# Patient Record
Sex: MALE | Race: BLACK OR AFRICAN AMERICAN | NOT HISPANIC OR LATINO | URBAN - METROPOLITAN AREA
[De-identification: names, ages, dates, MRNs, and addresses within clinical notes are randomized per-mention and may not be internally consistent; named-entity substitution may affect disease eponyms.]

---

## 2020-02-17 NOTE — PATIENT DISCUSSION
"""Continue Artificial tears (thera tears) both eyes twice a day . Start Warm compresses both eyes ""

## 2020-07-08 ENCOUNTER — IMPORTED ENCOUNTER (OUTPATIENT)
Dept: URBAN - METROPOLITAN AREA CLINIC 9 | Facility: CLINIC | Age: 80
End: 2020-07-08

## 2021-07-29 ENCOUNTER — IMPORTED ENCOUNTER (OUTPATIENT)
Dept: URBAN - METROPOLITAN AREA CLINIC 9 | Facility: CLINIC | Age: 81
End: 2021-07-29

## 2021-07-29 PROBLEM — H43.813: Noted: 2021-07-29

## 2021-07-29 PROBLEM — H11.153: Noted: 2021-07-29

## 2021-07-29 PROBLEM — H26.492: Noted: 2021-07-29

## 2021-09-09 NOTE — PATIENT DISCUSSION
Start Tobradex OS Qd. BCL applied to day. (Acuvue Oasys +0.00 BC 8.8 MARIA DEL CARMEN 14.0). Follow up in 1 week.

## 2021-09-10 NOTE — PATIENT DISCUSSION
BCL removed in office by technician. Patient declined BCL. Abrasion noted at @4:30-6:30. Continue Tobradex OS QID. Advised patient to start PF tears as needed. Start Gel drop QHS.

## 2021-09-16 NOTE — PATIENT DISCUSSION
healed,  discussed recurrent erosion syndrome with patient,  recommend patient use artificial tear ointment at bedtime every single night.

## 2021-10-17 ASSESSMENT — KERATOMETRY
OS_K2POWER_DIOPTERS: 41.25
OD_AXISANGLE_DEGREES: 44
OD_K1POWER_DIOPTERS: 40
OD_AXISANGLE_DEGREES: 81
OS_AXISANGLE_DEGREES: 70
OS_AXISANGLE2_DEGREES: 162
OS_AXISANGLE2_DEGREES: 160
OS_AXISANGLE_DEGREES: 72
OD_AXISANGLE2_DEGREES: 171
OD_K2POWER_DIOPTERS: 41
OD_K1POWER_DIOPTERS: 40.5
OS_K2POWER_DIOPTERS: 41.25
OD_K2POWER_DIOPTERS: 41
OD_AXISANGLE2_DEGREES: 134
OS_K1POWER_DIOPTERS: 39.75
OS_K1POWER_DIOPTERS: 39.75

## 2021-10-17 ASSESSMENT — VISUAL ACUITY
OD_CC: 20/20 SN
OS_CC: 20/20 - SN
OD_CC: 20/20 - SN
OS_SC: 20/30 - SN
OS_CC: 20/20 - SN
OD_SC: 20/50 SN
OS_CC: 20/20 SN
OS_SC: 20/50 SN
OD_SC: 20/30 - SN
OD_CC: 20/25 + SN
OS_CC: 20/25 SN

## 2021-10-17 ASSESSMENT — TONOMETRY
OS_IOP_MMHG: 13
OD_IOP_MMHG: 12
OD_IOP_MMHG: 12
OS_IOP_MMHG: 12

## 2021-10-22 NOTE — PATIENT DISCUSSION
Advised patient to RTC sooner if increased pain, redness, or visual changes. Patient will be out of town for 1 week. Will monitor in 1 week.

## 2021-10-22 NOTE — PATIENT DISCUSSION
Discussed recurrent erosion syndrome with patient,  recommend patient continue use of artificial tear ointment at bedtime every single night.

## 2021-10-22 NOTE — PATIENT DISCUSSION
Recommend BCL but patient declines.  Patient states BCL previously caused irritation and increased discomfort in the past.

## 2021-10-22 NOTE — PATIENT DISCUSSION
Discussed with patient about trying a Prokera Ring to help limit the recurring erosions if this problem persists.

## 2021-11-12 NOTE — PATIENT DISCUSSION
Discussed with patient the option for punctal plugs. Explained to patient the purpose for punctal plugs.

## 2021-11-12 NOTE — PATIENT DISCUSSION
Discussed the option and side affects of Xiidra and Restasis. Also informed the patient that they do take longer to show signs of relief. Xiidra- 6-8 weeks; Restasis- 2-3 months. Patient wants to continue current regiment. Sample of Mario Patterson given for patient to trial OU BID.

## 2022-01-14 NOTE — PATIENT DISCUSSION
Discussed with patient the option for punctal plugs. Explained to patient the purpose for punctal plugs. spontaneous

## 2022-01-14 NOTE — PATIENT DISCUSSION
Discussed the option and side affects of Xiidra and Restasis. Also informed the patient that they do take longer to show signs of relief. Xiidra- 6-8 weeks; Restasis- 2-3 months. Patient wants to continue current regiment. Sample of Yanni Solo given for patient to trial OU BID.

## 2022-01-14 NOTE — PATIENT DISCUSSION
Will start patient on Prolensa 1gtt QD, OU. Explained to patient that this is essentially Advil for the eye. Discontinue baby shampoo, and use Ocu Soft lid Scrubs. Provided patient sample.

## 2022-01-18 NOTE — PATIENT DISCUSSION
Patient did not start prolensa secondary to allergic to sulfa and her pharmacist told her not to start.

## 2022-01-18 NOTE — PATIENT DISCUSSION
Instructed patient if this does not help she is to call the office for a follow up.  Recommend patient keep annual exam that is scheduled.

## 2022-01-18 NOTE — PATIENT DISCUSSION
Recommend patient start tobradex 4 times a day in both eyes for 1 week then stop,  start pataday green label and use it once a day in both eyes.

## 2022-02-23 NOTE — PATIENT DISCUSSION
Discussed the option and side affects of Xiidra and Restasis. Also informed the patient that they do take longer to show signs of relief. Xiidra- 6-8 weeks; Restasis- 2-3 months. Patient wants to continue current regiment. Sample of Rayne Carmen given for patient to trial OU BID.

## 2022-02-23 NOTE — PATIENT DISCUSSION
Recommend patient start Flarex OU TID until sample is gone. Once Flarex is gone patient is to restart the Pataday OU (everyday no matter what). Patient instructed do not rub eyes and to use a cool compress if itching continues. Will check symptoms in 2 weeks. If patient has CARMEN at that appointment we will do PP at that time.

## 2022-02-23 NOTE — PATIENT DISCUSSION
Recommend patient start OTC allergy oral medication. Also recommend patient see allergist to see what she is actually allergic too.

## 2022-03-09 NOTE — PATIENT DISCUSSION
Discussed the option and side affects of Xiidra and Restasis. Also informed the patient that they do take longer to show signs of relief. Xiidra- 6-8 weeks; Restasis- 2-3 months. Patient wants to continue current regiment. Sample of Steven David given for patient to trial OU BID.

## 2022-03-09 NOTE — PATIENT DISCUSSION
Patient has noticed improvement. Will have her continue Zaditor. Continue artificial tears 4 times a day. Name band;

## 2022-04-06 ENCOUNTER — ESTABLISHED PATIENT (OUTPATIENT)
Dept: URBAN - METROPOLITAN AREA CLINIC 4 | Facility: CLINIC | Age: 82
End: 2022-04-06

## 2022-04-06 DIAGNOSIS — H11.153: ICD-10-CM

## 2022-04-06 PROCEDURE — 92015 DETERMINE REFRACTIVE STATE: CPT

## 2022-04-06 PROCEDURE — 92014 COMPRE OPH EXAM EST PT 1/>: CPT

## 2022-04-06 ASSESSMENT — KERATOMETRY
OS_AXISANGLE_DEGREES: 072
OD_AXISANGLE2_DEGREES: 171
OS_K1POWER_DIOPTERS: 39.50
OD_K1POWER_DIOPTERS: 40.75
OD_K1POWER_DIOPTERS: 40
OS_AXISANGLE2_DEGREES: 160
OD_AXISANGLE2_DEGREES: 174
OD_AXISANGLE_DEGREES: 81
OS_K1POWER_DIOPTERS: 39.75
OD_K2POWER_DIOPTERS: 41
OD_K2POWER_DIOPTERS: 41.00
OS_AXISANGLE_DEGREES: 70
OS_AXISANGLE2_DEGREES: 162
OS_K2POWER_DIOPTERS: 41.25
OD_AXISANGLE_DEGREES: 084

## 2022-04-06 ASSESSMENT — VISUAL ACUITY
OD_GLARE: 20/50
OS_SC: 20/30-2
OD_SC: 20/30-2
OS_GLARE: 20/40
OS_CC: 20/25-1
OD_CC: 20/25-1
OU_CC: 20/25
OU_SC: 20/30-1

## 2022-04-06 ASSESSMENT — TONOMETRY
OD_IOP_MMHG: 12
OS_IOP_MMHG: 13

## 2022-05-16 NOTE — PATIENT DISCUSSION
Patient has noticed improvement. Will have her continue Zaditor. Continue artificial tears 4 times a day.

## 2022-09-19 NOTE — PATIENT DISCUSSION
OD>OS. Recommended keeping eyes moist. Gave patient a sample of Systane PF and Refresh PF, recommended patient to just continue trying drops that work best for you. Advised patient to use PF every hour, if not PF only use QID. Patient made aware that after using drops her vision will be blurry for a few minutes after instilling.

## 2023-04-06 ENCOUNTER — ESTABLISHED PATIENT (OUTPATIENT)
Dept: URBAN - METROPOLITAN AREA CLINIC 4 | Facility: CLINIC | Age: 83
End: 2023-04-06

## 2023-04-06 DIAGNOSIS — H11.153: ICD-10-CM

## 2023-04-06 DIAGNOSIS — H26.492: ICD-10-CM

## 2023-04-06 DIAGNOSIS — H43.813: ICD-10-CM

## 2023-04-06 PROCEDURE — 92014 COMPRE OPH EXAM EST PT 1/>: CPT

## 2023-04-06 PROCEDURE — 92015 DETERMINE REFRACTIVE STATE: CPT

## 2023-04-06 ASSESSMENT — KERATOMETRY
OS_K1POWER_DIOPTERS: 39.75
OS_K2POWER_DIOPTERS: 41.50
OD_K1POWER_DIOPTERS: 40.00
OD_AXISANGLE_DEGREES: 70
OD_AXISANGLE2_DEGREES: 160
OS_AXISANGLE_DEGREES: 66
OD_K2POWER_DIOPTERS: 41.25
OS_AXISANGLE2_DEGREES: 156

## 2023-04-06 ASSESSMENT — VISUAL ACUITY
OD_GLARE: 20/50
OS_GLARE: 20/70
OU_SC: 20/40
OD_SC: 20/40
OS_SC: 20/40-2

## 2023-04-06 ASSESSMENT — TONOMETRY
OS_IOP_MMHG: 12
OD_IOP_MMHG: 12

## 2024-04-08 ENCOUNTER — ESTABLISHED PATIENT (OUTPATIENT)
Facility: LOCATION | Age: 84
End: 2024-04-08

## 2024-04-08 DIAGNOSIS — H26.492: ICD-10-CM

## 2024-04-08 DIAGNOSIS — H11.153: ICD-10-CM

## 2024-04-08 DIAGNOSIS — H43.813: ICD-10-CM

## 2024-04-08 PROCEDURE — 92014 COMPRE OPH EXAM EST PT 1/>: CPT

## 2024-04-08 PROCEDURE — 92015 DETERMINE REFRACTIVE STATE: CPT

## 2024-04-08 ASSESSMENT — KERATOMETRY
OS_AXISANGLE_DEGREES: 69
OD_K2POWER_DIOPTERS: 40.75
OS_K2POWER_DIOPTERS: 41.50
OD_K1POWER_DIOPTERS: 40.50
OS_K1POWER_DIOPTERS: 40.00
OD_AXISANGLE2_DEGREES: 161
OD_AXISANGLE_DEGREES: 71
OS_AXISANGLE2_DEGREES: 159

## 2024-04-08 ASSESSMENT — VISUAL ACUITY
OD_GLARE: 20/60
OU_CC: 20/20-2
OD_CC: 20/40
OS_GLARE: 20/60
OS_CC: 20/20-2

## 2024-04-08 ASSESSMENT — TONOMETRY
OS_IOP_MMHG: 14
OD_IOP_MMHG: 14

## 2025-04-09 ENCOUNTER — COMPREHENSIVE EXAM (OUTPATIENT)
Age: 85
End: 2025-04-09

## 2025-04-09 DIAGNOSIS — H26.492: ICD-10-CM

## 2025-04-09 DIAGNOSIS — H11.153: ICD-10-CM

## 2025-04-09 DIAGNOSIS — H43.813: ICD-10-CM

## 2025-04-09 PROCEDURE — 92014 COMPRE OPH EXAM EST PT 1/>: CPT

## 2025-04-09 PROCEDURE — 92015 DETERMINE REFRACTIVE STATE: CPT

## 2025-04-29 ENCOUNTER — SURGERY/PROCEDURE (OUTPATIENT)
Age: 85
End: 2025-04-29

## 2025-04-29 DIAGNOSIS — H26.492: ICD-10-CM

## 2025-04-29 PROCEDURE — 66821 AFTER CATARACT LASER SURGERY: CPT

## 2025-06-03 ENCOUNTER — POST-OP (OUTPATIENT)
Age: 85
End: 2025-06-03

## 2025-06-03 DIAGNOSIS — Z98.890: ICD-10-CM

## 2025-06-03 PROCEDURE — 99024 POSTOP FOLLOW-UP VISIT: CPT
